# Patient Record
Sex: FEMALE | Race: ASIAN | NOT HISPANIC OR LATINO | URBAN - METROPOLITAN AREA
[De-identification: names, ages, dates, MRNs, and addresses within clinical notes are randomized per-mention and may not be internally consistent; named-entity substitution may affect disease eponyms.]

---

## 2017-06-14 ENCOUNTER — INPATIENT (INPATIENT)
Facility: HOSPITAL | Age: 32
LOS: 1 days | Discharge: HOME | End: 2017-06-16
Attending: OBSTETRICS & GYNECOLOGY | Admitting: OBSTETRICS & GYNECOLOGY
Payer: COMMERCIAL

## 2017-06-14 VITALS — HEIGHT: 68 IN | WEIGHT: 199.96 LBS

## 2017-06-14 LAB
BASOPHILS NFR BLD AUTO: 0.3 % — SIGNIFICANT CHANGE UP (ref 0–2)
BLD GP AB SCN SERPL QL: NEGATIVE — SIGNIFICANT CHANGE UP
EOSINOPHIL NFR BLD AUTO: 4.3 % — SIGNIFICANT CHANGE UP (ref 0–6)
HCT VFR BLD CALC: 36.4 % — SIGNIFICANT CHANGE UP (ref 34.5–45)
HGB BLD-MCNC: 12.3 G/DL — SIGNIFICANT CHANGE UP (ref 11.5–15.5)
LYMPHOCYTES # BLD AUTO: 16.6 % — SIGNIFICANT CHANGE UP (ref 13–44)
MCHC RBC-ENTMCNC: 32.7 PG — SIGNIFICANT CHANGE UP (ref 27–34)
MCHC RBC-ENTMCNC: 33.8 G/DL — SIGNIFICANT CHANGE UP (ref 32–36)
MCV RBC AUTO: 96.8 FL — SIGNIFICANT CHANGE UP (ref 80–100)
MONOCYTES NFR BLD AUTO: 8.8 % — SIGNIFICANT CHANGE UP (ref 2–14)
NEUTROPHILS NFR BLD AUTO: 70 % — SIGNIFICANT CHANGE UP (ref 43–77)
PLATELET # BLD AUTO: 268 K/UL — SIGNIFICANT CHANGE UP (ref 150–400)
RBC # BLD: 3.76 M/UL — LOW (ref 3.8–5.2)
RBC # FLD: 14 % — SIGNIFICANT CHANGE UP (ref 10.3–16.9)
RH IG SCN BLD-IMP: POSITIVE — SIGNIFICANT CHANGE UP
RH IG SCN BLD-IMP: POSITIVE — SIGNIFICANT CHANGE UP
T PALLIDUM AB TITR SER: NEGATIVE — SIGNIFICANT CHANGE UP
WBC # BLD: 15.3 K/UL — HIGH (ref 3.8–10.5)
WBC # FLD AUTO: 15.3 K/UL — HIGH (ref 3.8–10.5)

## 2017-06-14 RX ORDER — OXYTOCIN 10 UNIT/ML
41.67 VIAL (ML) INJECTION
Qty: 20 | Refills: 0 | Status: DISCONTINUED | OUTPATIENT
Start: 2017-06-14 | End: 2017-06-16

## 2017-06-14 RX ORDER — DIPHENHYDRAMINE HCL 50 MG
25 CAPSULE ORAL EVERY 6 HOURS
Qty: 0 | Refills: 0 | Status: DISCONTINUED | OUTPATIENT
Start: 2017-06-14 | End: 2017-06-16

## 2017-06-14 RX ORDER — AER TRAVELER 0.5 G/1
1 SOLUTION RECTAL; TOPICAL EVERY 4 HOURS
Qty: 0 | Refills: 0 | Status: DISCONTINUED | OUTPATIENT
Start: 2017-06-14 | End: 2017-06-16

## 2017-06-14 RX ORDER — SODIUM CHLORIDE 9 MG/ML
1000 INJECTION, SOLUTION INTRAVENOUS ONCE
Qty: 0 | Refills: 0 | Status: DISCONTINUED | OUTPATIENT
Start: 2017-06-14 | End: 2017-06-14

## 2017-06-14 RX ORDER — GLYCERIN ADULT
1 SUPPOSITORY, RECTAL RECTAL AT BEDTIME
Qty: 0 | Refills: 0 | Status: DISCONTINUED | OUTPATIENT
Start: 2017-06-14 | End: 2017-06-16

## 2017-06-14 RX ORDER — SODIUM CHLORIDE 9 MG/ML
1000 INJECTION, SOLUTION INTRAVENOUS
Qty: 0 | Refills: 0 | Status: DISCONTINUED | OUTPATIENT
Start: 2017-06-14 | End: 2017-06-14

## 2017-06-14 RX ORDER — IBUPROFEN 200 MG
600 TABLET ORAL EVERY 6 HOURS
Qty: 0 | Refills: 0 | Status: DISCONTINUED | OUTPATIENT
Start: 2017-06-14 | End: 2017-06-16

## 2017-06-14 RX ORDER — LANOLIN
1 OINTMENT (GRAM) TOPICAL EVERY 6 HOURS
Qty: 0 | Refills: 0 | Status: DISCONTINUED | OUTPATIENT
Start: 2017-06-14 | End: 2017-06-16

## 2017-06-14 RX ORDER — ACETAMINOPHEN 500 MG
650 TABLET ORAL EVERY 6 HOURS
Qty: 0 | Refills: 0 | Status: DISCONTINUED | OUTPATIENT
Start: 2017-06-14 | End: 2017-06-16

## 2017-06-14 RX ORDER — TETANUS TOXOID, REDUCED DIPHTHERIA TOXOID AND ACELLULAR PERTUSSIS VACCINE, ADSORBED 5; 2.5; 8; 8; 2.5 [IU]/.5ML; [IU]/.5ML; UG/.5ML; UG/.5ML; UG/.5ML
0.5 SUSPENSION INTRAMUSCULAR ONCE
Qty: 0 | Refills: 0 | Status: DISCONTINUED | OUTPATIENT
Start: 2017-06-14 | End: 2017-06-16

## 2017-06-14 RX ORDER — DIBUCAINE 1 %
1 OINTMENT (GRAM) RECTAL EVERY 4 HOURS
Qty: 0 | Refills: 0 | Status: DISCONTINUED | OUTPATIENT
Start: 2017-06-14 | End: 2017-06-16

## 2017-06-14 RX ORDER — BENZOCAINE 10 %
1 GEL (GRAM) MUCOUS MEMBRANE EVERY 6 HOURS
Qty: 0 | Refills: 0 | Status: DISCONTINUED | OUTPATIENT
Start: 2017-06-14 | End: 2017-06-16

## 2017-06-14 RX ORDER — SIMETHICONE 80 MG/1
80 TABLET, CHEWABLE ORAL EVERY 6 HOURS
Qty: 0 | Refills: 0 | Status: DISCONTINUED | OUTPATIENT
Start: 2017-06-14 | End: 2017-06-16

## 2017-06-14 RX ORDER — CITRIC ACID/SODIUM CITRATE 300-500 MG
15 SOLUTION, ORAL ORAL EVERY 4 HOURS
Qty: 0 | Refills: 0 | Status: DISCONTINUED | OUTPATIENT
Start: 2017-06-14 | End: 2017-06-14

## 2017-06-14 RX ORDER — OXYTOCIN 10 UNIT/ML
1 VIAL (ML) INJECTION
Qty: 30 | Refills: 0 | Status: DISCONTINUED | OUTPATIENT
Start: 2017-06-14 | End: 2017-06-14

## 2017-06-14 RX ORDER — OXYTOCIN 10 UNIT/ML
333.33 VIAL (ML) INJECTION
Qty: 20 | Refills: 0 | Status: DISCONTINUED | OUTPATIENT
Start: 2017-06-14 | End: 2017-06-14

## 2017-06-14 RX ORDER — HYDROCORTISONE 1 %
1 OINTMENT (GRAM) TOPICAL EVERY 4 HOURS
Qty: 0 | Refills: 0 | Status: DISCONTINUED | OUTPATIENT
Start: 2017-06-14 | End: 2017-06-16

## 2017-06-14 RX ORDER — PRAMOXINE HYDROCHLORIDE 150 MG/15G
1 AEROSOL, FOAM RECTAL EVERY 4 HOURS
Qty: 0 | Refills: 0 | Status: DISCONTINUED | OUTPATIENT
Start: 2017-06-14 | End: 2017-06-16

## 2017-06-14 RX ORDER — OXYTOCIN 10 UNIT/ML
333.33 VIAL (ML) INJECTION
Qty: 20 | Refills: 0 | Status: COMPLETED | OUTPATIENT
Start: 2017-06-14

## 2017-06-14 RX ORDER — DOCUSATE SODIUM 100 MG
100 CAPSULE ORAL
Qty: 0 | Refills: 0 | Status: DISCONTINUED | OUTPATIENT
Start: 2017-06-14 | End: 2017-06-16

## 2017-06-14 RX ORDER — SODIUM CHLORIDE 9 MG/ML
3 INJECTION INTRAMUSCULAR; INTRAVENOUS; SUBCUTANEOUS EVERY 8 HOURS
Qty: 0 | Refills: 0 | Status: DISCONTINUED | OUTPATIENT
Start: 2017-06-14 | End: 2017-06-16

## 2017-06-14 RX ORDER — OXYTOCIN 10 UNIT/ML
2 VIAL (ML) INJECTION
Qty: 30 | Refills: 0 | Status: DISCONTINUED | OUTPATIENT
Start: 2017-06-14 | End: 2017-06-14

## 2017-06-14 RX ORDER — MAGNESIUM HYDROXIDE 400 MG/1
30 TABLET, CHEWABLE ORAL
Qty: 0 | Refills: 0 | Status: DISCONTINUED | OUTPATIENT
Start: 2017-06-14 | End: 2017-06-16

## 2017-06-14 RX ADMIN — SODIUM CHLORIDE 125 MILLILITER(S): 9 INJECTION, SOLUTION INTRAVENOUS at 03:15

## 2017-06-14 RX ADMIN — SODIUM CHLORIDE 125 MILLILITER(S): 9 INJECTION, SOLUTION INTRAVENOUS at 04:38

## 2017-06-14 RX ADMIN — SODIUM CHLORIDE 125 MILLILITER(S): 9 INJECTION, SOLUTION INTRAVENOUS at 11:05

## 2017-06-14 RX ADMIN — SODIUM CHLORIDE 3 MILLILITER(S): 9 INJECTION INTRAMUSCULAR; INTRAVENOUS; SUBCUTANEOUS at 22:00

## 2017-06-14 RX ADMIN — Medication 1 MILLIUNIT(S)/MIN: at 04:33

## 2017-06-14 RX ADMIN — Medication 125 MILLIUNIT(S)/MIN: at 14:28

## 2017-06-14 RX ADMIN — Medication 600 MILLIGRAM(S): at 18:07

## 2017-06-14 RX ADMIN — Medication 600 MILLIGRAM(S): at 19:08

## 2017-06-14 RX ADMIN — Medication 125 MILLIUNIT(S)/MIN: at 16:28

## 2017-06-15 RX ADMIN — Medication 600 MILLIGRAM(S): at 20:15

## 2017-06-15 RX ADMIN — Medication 600 MILLIGRAM(S): at 01:00

## 2017-06-15 RX ADMIN — SODIUM CHLORIDE 3 MILLILITER(S): 9 INJECTION INTRAMUSCULAR; INTRAVENOUS; SUBCUTANEOUS at 06:00

## 2017-06-15 RX ADMIN — Medication 600 MILLIGRAM(S): at 13:32

## 2017-06-15 RX ADMIN — Medication 600 MILLIGRAM(S): at 12:55

## 2017-06-15 RX ADMIN — Medication 100 MILLIGRAM(S): at 12:55

## 2017-06-15 RX ADMIN — Medication 600 MILLIGRAM(S): at 06:58

## 2017-06-15 RX ADMIN — Medication 1 TABLET(S): at 12:55

## 2017-06-15 RX ADMIN — Medication 600 MILLIGRAM(S): at 06:35

## 2017-06-15 RX ADMIN — Medication 600 MILLIGRAM(S): at 19:32

## 2017-06-15 RX ADMIN — Medication 600 MILLIGRAM(S): at 00:25

## 2017-06-15 NOTE — PROGRESS NOTE ADULT - ASSESSMENT
A/P 31y PPD#1 s/p , stable  1. Pain: well controlled on OPM  2. GI: Regular diet  3. : voiding  4. DVT prophylaxis: ambulate  5. Dispo: PPD 2, unless otherwise specified

## 2017-06-16 VITALS
OXYGEN SATURATION: 99 % | HEART RATE: 69 BPM | TEMPERATURE: 98 F | DIASTOLIC BLOOD PRESSURE: 64 MMHG | SYSTOLIC BLOOD PRESSURE: 96 MMHG | RESPIRATION RATE: 18 BRPM

## 2017-06-16 PROCEDURE — 86900 BLOOD TYPING SEROLOGIC ABO: CPT

## 2017-06-16 PROCEDURE — 85025 COMPLETE CBC W/AUTO DIFF WBC: CPT

## 2017-06-16 PROCEDURE — 86901 BLOOD TYPING SEROLOGIC RH(D): CPT

## 2017-06-16 PROCEDURE — 86780 TREPONEMA PALLIDUM: CPT

## 2017-06-16 PROCEDURE — 36415 COLL VENOUS BLD VENIPUNCTURE: CPT

## 2017-06-16 PROCEDURE — 86850 RBC ANTIBODY SCREEN: CPT

## 2017-06-16 RX ADMIN — Medication 100 MILLIGRAM(S): at 11:45

## 2017-06-16 RX ADMIN — Medication 600 MILLIGRAM(S): at 06:15

## 2017-06-16 RX ADMIN — Medication 600 MILLIGRAM(S): at 01:18

## 2017-06-16 RX ADMIN — Medication 600 MILLIGRAM(S): at 11:45

## 2017-06-16 RX ADMIN — Medication 600 MILLIGRAM(S): at 12:40

## 2017-06-16 RX ADMIN — Medication 600 MILLIGRAM(S): at 06:33

## 2017-06-16 RX ADMIN — Medication 1 TABLET(S): at 11:45

## 2017-06-16 RX ADMIN — Medication 600 MILLIGRAM(S): at 00:54

## 2017-06-16 NOTE — DISCHARGE NOTE OB - PLAN OF CARE
healthy recovery pelvic rest for 6 weeks - no intercourse, tampons, douching, etc. follow up with OB in 4 weeks

## 2017-06-16 NOTE — DISCHARGE NOTE OB - CARE PROVIDER_API CALL
Guille Dan (MD), Obstetrics and Gynecology  [address] [place] [address], [place] [address]  Phone: [phone]  Fax: [phone]

## 2017-06-16 NOTE — PROGRESS NOTE ADULT - ASSESSMENT
A/P  31y   s/p , PPD #2  ,stable  1. Pain: well controlled on OPM  2. GI: Regular diet  3. : voiding spontaneously  4. DVT prophylaxis: SCDs, ambulate  5. Dispo: PPD 2, unless otherwise specified

## 2017-06-16 NOTE — DISCHARGE NOTE OB - YES
5/2/2017    [id number]  [place] [id number]    Dear Jordan:    [facility] wants to ensure your discharge home is safe and you or your loved ones have had all of your questions answered regarding your care after you leave the hospital.    Below is a list of resources and contact information should you have any questions regarding your hospital stay, follow-up instructions, or active medical symptoms.    Questions or Concerns Regarding… Contact   Medical Questions Related to Your Discharge  (7 days a week, 8am-5pm) Contact a Nurse Care Coordinator   [phone]   Medical Questions Not Related to Your Discharge  (24 hours a day / 7 days a week)  Contact the Nurse Health Line   [phone]    Medications or Discharge Instructions Refer to your discharge packet   or contact your [facility] Primary Care Provider   [phone]   Follow-up Appointment(s) Schedule your appointment via [employer]   or contact Scheduling [phone]   Billing Review your statement via [employer]  or contact Billing [phone]   Medical Records Review your records via [employer]   or contact Medical Records [phone]     You may receive a telephone call within two days of discharge. This call is to make certain you understand your discharge instructions and have the opportunity to have any questions answered. You can also easily access your medical information, test results and upcoming appointments via the [employer] free online health management tool. You can learn more and sign up at [employer]. For assistance setting up your [employer] account, please call [phone].    Once again, we want to ensure your discharge home is safe and that you have a clear understanding of any next steps in your care. If you have any questions or concerns, please do not hesitate to contact us, we are here for you. Thank you for choosing [facility] for your healthcare needs.    Sincerely,    Your [facility] Healthcare Team          Statement Selected

## 2017-06-16 NOTE — DISCHARGE NOTE OB - PATIENT PORTAL LINK FT
“You can access the [employer] Patient Portal, offered by [facility], by registering with the following website: [email or web address]”

## 2017-06-16 NOTE — PROGRESS NOTE ADULT - SUBJECTIVE AND OBJECTIVE BOX
Pt out of room at time of AM rounds.  Chart reviewed, no acute events overnight.
PPD #2  stable s/p  without postpartum complaint  abd soft NT  perineum repairing  discharge planning for today reviewed  office follow up in 4 wks.
Patient evaluated at bedside.   She reports pain is well controlled.  She has been ambulating without assistance, voiding spontaneously, and is breastfeeding.  She has no concerns at this time.    Physical Exam:  T(C): 36.2, Max: 36.5 (06-14 @ 22:24)  HR: 94 (82 - 94)  BP: 97/53 (97/53 - 98/50)  RR: 18 (18 - 18)  SpO2: 97% (97% - 98%)  Wt(kg): --    GA: NAD, A+0 x 3  Abd: soft, nontender, nondistended, no rebound or guarding, uterus firm at midline, below the umbilicus  : lochia WNL  Extremities: no calf tenderness                            12.3   15.3  )-----------( 268      ( 14 Jun 2017 02:42 )             36.4

## 2017-06-16 NOTE — DISCHARGE NOTE OB - CARE PLAN
Principal Discharge DX:	Postpartum state  Goal:	healthy recovery  Instructions for follow-up, activity and diet:	pelvic rest for 6 weeks - no intercourse, tampons, douching, etc. follow up with OB in 4 weeks

## 2017-06-20 DIAGNOSIS — Z3A.41 41 WEEKS GESTATION OF PREGNANCY: ICD-10-CM

## 2017-06-20 DIAGNOSIS — O48.0 POST-TERM PREGNANCY: ICD-10-CM

## 2023-12-05 NOTE — PATIENT PROFILE OB - BREASTFEEDING PROVIDES MATERNAL HEALTH BENEFITS, DECREASED PREMENOPAUSAL BREAST CANCER, OVARIAN CANCER AND TYPE II DIABETES MELLITUS
December 5, 2023     Aly Landin, [address] [address]    Patient: Fransico Rachel   YOB: 1995   Date of Visit: 12/5/2023       Dear Dr. Noah Cooper: Thank you for referring Fransico Rachel to me for evaluation. Below are my notes for this consultation. If you have questions, please do not hesitate to call me. I look forward to following your patient along with you. Sincerely,        Yrn Grier MD        CC: No Recipients    Yrn Grier MD  12/5/2023  1:13 PM  Sign when Signing Visit  A fetal ultrasound and NST were completed. See Ob procedures in Epic for an interpretation and recommendations. Do not hesitate to contact us in [facility] if you have questions. Nancy Her MD, [address] [facility]  Maternal Fetal Medicine Statement Selected